# Patient Record
Sex: FEMALE | Race: BLACK OR AFRICAN AMERICAN | URBAN - METROPOLITAN AREA
[De-identification: names, ages, dates, MRNs, and addresses within clinical notes are randomized per-mention and may not be internally consistent; named-entity substitution may affect disease eponyms.]

---

## 2022-02-24 ENCOUNTER — LAB OUTSIDE AN ENCOUNTER (OUTPATIENT)
Dept: URBAN - METROPOLITAN AREA CLINIC 74 | Facility: CLINIC | Age: 72
End: 2022-02-24

## 2022-02-24 ENCOUNTER — WEB ENCOUNTER (OUTPATIENT)
Dept: URBAN - METROPOLITAN AREA CLINIC 74 | Facility: CLINIC | Age: 72
End: 2022-02-24

## 2022-02-24 ENCOUNTER — OFFICE VISIT (OUTPATIENT)
Dept: URBAN - METROPOLITAN AREA CLINIC 74 | Facility: CLINIC | Age: 72
End: 2022-02-24
Payer: MEDICARE

## 2022-02-24 VITALS
HEART RATE: 65 BPM | HEIGHT: 67 IN | BODY MASS INDEX: 35.03 KG/M2 | TEMPERATURE: 97.6 F | SYSTOLIC BLOOD PRESSURE: 160 MMHG | DIASTOLIC BLOOD PRESSURE: 108 MMHG | OXYGEN SATURATION: 98 % | WEIGHT: 223.2 LBS

## 2022-02-24 DIAGNOSIS — E66.9 OBESITY (BMI 30-39.9): ICD-10-CM

## 2022-02-24 DIAGNOSIS — R19.4 CHANGE IN BOWEL HABIT: ICD-10-CM

## 2022-02-24 DIAGNOSIS — R14.0 BLOATING: ICD-10-CM

## 2022-02-24 DIAGNOSIS — R10.32 LLQ DISCOMFORT: ICD-10-CM

## 2022-02-24 PROCEDURE — 82274 ASSAY TEST FOR BLOOD FECAL: CPT | Performed by: INTERNAL MEDICINE

## 2022-02-24 PROCEDURE — 99243 OFF/OP CNSLTJ NEW/EST LOW 30: CPT | Performed by: INTERNAL MEDICINE

## 2022-02-24 RX ORDER — CELECOXIB 200 MG/1
1 CAPSULE WITH FOOD CAPSULE ORAL ONCE A DAY
Status: ACTIVE | COMMUNITY

## 2022-02-24 RX ORDER — MELOXICAM 15 MG/1
1 TABLET TABLET ORAL ONCE A DAY
Status: ACTIVE | COMMUNITY

## 2022-02-24 RX ORDER — OLMESARTAN MEDOXOMIL AND HYDROCHLOROTHIAZIDE 40/12.5 40; 12.5 MG/1; MG/1
1 TABLET TABLET ORAL ONCE A DAY
Status: ACTIVE | COMMUNITY

## 2022-02-24 NOTE — HPI-TODAY'S VISIT:
The patient is a 71-year-old Afro-American female.  The patient was referred by Dr. Albina Souza for consultation.  A copy of these document is being forwarded to the referring physician.  The patient presents stating that a couple weeks ago she started developing progressive discomfort over the left lower quadrant, it occurred after taking oral supplements to cleanse her bowel, at the time she was also ingesting a fair amount of knots.  The patient claims that she felt like something was "crawling "over the left side of her abdomen, the patient claimed that the symptomatology was aggravating by changing in position, standing and walking.  It might have radiated into the lower left back.  There were no urinary symptoms.  The patient developed some abdominal bloating and had temporarily loose stools which gradually improved.  Currently she denies having more than a slight discomfort over the left lower quadrant, there has been no fever no urinary symptoms and bowel movements are now formed, there has been no rectal bleeding.  The patient denies having any upper GI symptoms such as dysphagia, odynophagia, nausea, vomiting, heartburn.  The patient states that she had a colonoscopy maybe 8 to 9 years ago, she claims that the colonoscopy was normal.  Records are not available and will be requested for review.  The patient has been advised to get a CBC, CMP, CRP, Hemosure.  We will obtain a CT of abdomen and pelvis with contrast as long as the renal function allows it.  The patient is hypertensive on medications.  The patient will return for a follow-up visit after completion of testing.  At the time of the visit it is we discussed colonic diverticulosis possible mild diverticulitis.

## 2022-04-06 PROBLEM — 733657002: Status: ACTIVE | Noted: 2022-04-06

## 2022-04-06 PROBLEM — 162864005: Status: ACTIVE | Noted: 2022-02-24

## 2022-04-06 PROBLEM — 129851009: Status: ACTIVE | Noted: 2022-02-24

## 2022-04-06 PROBLEM — 116289008: Status: ACTIVE | Noted: 2022-02-24

## 2022-04-06 PROBLEM — 301716002: Status: ACTIVE | Noted: 2022-02-24

## 2022-04-07 ENCOUNTER — DASHBOARD ENCOUNTERS (OUTPATIENT)
Age: 72
End: 2022-04-07

## 2022-04-12 ENCOUNTER — OFFICE VISIT (OUTPATIENT)
Dept: URBAN - METROPOLITAN AREA CLINIC 74 | Facility: CLINIC | Age: 72
End: 2022-04-12

## 2022-04-12 RX ORDER — MELOXICAM 15 MG/1
1 TABLET TABLET ORAL ONCE A DAY
Status: ACTIVE | COMMUNITY

## 2022-04-12 RX ORDER — CELECOXIB 200 MG/1
1 CAPSULE WITH FOOD CAPSULE ORAL ONCE A DAY
Status: ACTIVE | COMMUNITY

## 2022-04-12 RX ORDER — OLMESARTAN MEDOXOMIL AND HYDROCHLOROTHIAZIDE 40/12.5 40; 12.5 MG/1; MG/1
1 TABLET TABLET ORAL ONCE A DAY
Status: ACTIVE | COMMUNITY

## 2022-04-12 NOTE — HPI-TODAY'S VISIT:
The patient is a 71-year-old Afro-American female.  The patient was referred by Dr. Albina Souza for consultation.  A copy of these document is being forwarded to the referring physician.  The patient presents stating that a couple weeks ago she started developing progressive discomfort over the left lower quadrant, it occurred after taking oral supplements to cleanse her bowel, at the time she was also ingesting a fair amount of knots.  The patient claims that she felt like something was "crawling "over the left side of her abdomen, the patient claimed that the symptomatology was aggravating by changing in position, standing and walking.  It might have radiated into the lower left back.  There were no urinary symptoms.  The patient developed some abdominal bloating and had temporarily loose stools which gradually improved.  Currently she denies having more than a slight discomfort over the left lower quadrant, there has been no fever no urinary symptoms and bowel movements are now formed, there has been no rectal bleeding.  The patient denies having any upper GI symptoms such as dysphagia, odynophagia, nausea, vomiting, heartburn.  The patient states that she had a colonoscopy maybe 8 to 9 years ago, she claims that the colonoscopy was normal.  Records are not available and will be requested for review.  The patient has been advised to get a CBC, CMP, CRP, Hemosure.  We will obtain a CT of abdomen and pelvis with contrast as long as the renal function allows it.  The patient is hypertensive on medications.  The patient will return for a follow-up visit after completion of testing.  At the time of the visit it is we discussed colonic diverticulosis possible mild diverticulitis.  Today April 12, 2022 the patient returns for a follow-up visit, the patient was last seen in the office on February 24, 2022 with left lower quadrant abdominal discomfort, change in bowel habit, bloating and obesity.  At the time of the visit the patient presented with a few weeks of progressive discomfort over the left lower quadrant, it occurred while taking oral supplements to improve her bowel movements, at the time the patient was ingesting a fair amount of seeds.  The patient described the discomfort as something crawling over the left side of the abdomen, the symptomatology was aggravated by changing her position, standing and walking, it radiated into the lower back.  The patient denies having urinary symptoms.  The patient had some abdominal bloating and temporarily had loose bowel movements which gradually improved.  At the time of the visit the patient denied having significant discomfort, there was no fever, bowel movements have returned to normality and were formed, there was no rectal bleeding.  The patient denies having any upper GI symptoms such as dysphagia, odynophagia, nausea, vomiting, heartburn.  The patient claimed that she had a colonoscopy about 8 to 9 years before and that it was normal, records were not available for review.  The patient was advised to get a CBC, CMP, CRP, Hemosure and a CT of the abdomen and pelvis with contrast.  The patient will be evaluated for diverticulosis and possible recent diverticulitis.  There is no evidence that the patient had a laboratory or CT scan.  Review of records show that the patient did have a colonoscopy with Dr. Ladan Rachel in January 2013 which revealed mild diverticulosis of the sigmoid colon with no evidence of diverticulitis.  .